# Patient Record
(demographics unavailable — no encounter records)

---

## 2025-04-03 NOTE — HISTORY OF PRESENT ILLNESS
[FreeTextEntry1] : RPV- FBSE [de-identified] : Yasmeen Tafoya 45 y/o F presents for FBSE.  seen by Dr. Guan on 5/3/2024 -concerning spot on left lower extremity  Lists the following concerns today: PHx of skin cancer:no FHx of skin cancer:no H/x of blistering sunburns:no H/x of tanning bed use:yes Uses sunscreen regularly:yes

## 2025-04-03 NOTE — HISTORY OF PRESENT ILLNESS
[FreeTextEntry1] : RPV- FBSE [de-identified] : Yasmeen Tafoya 45 y/o F presents for FBSE.  seen by Dr. Guan on 5/3/2024 -concerning spot on left lower extremity  Lists the following concerns today: PHx of skin cancer:no FHx of skin cancer:no H/x of blistering sunburns:no H/x of tanning bed use:yes Uses sunscreen regularly:yes